# Patient Record
Sex: MALE | Race: WHITE | Employment: FULL TIME | URBAN - METROPOLITAN AREA
[De-identification: names, ages, dates, MRNs, and addresses within clinical notes are randomized per-mention and may not be internally consistent; named-entity substitution may affect disease eponyms.]

---

## 2020-08-11 ENCOUNTER — HOSPITAL ENCOUNTER (EMERGENCY)
Age: 20
Discharge: HOME | End: 2020-08-11
Attending: EMERGENCY MEDICINE
Payer: OTHER GOVERNMENT

## 2020-08-11 ENCOUNTER — APPOINTMENT (OUTPATIENT)
Dept: CT IMAGING | Age: 20
End: 2020-08-11
Attending: PHYSICIAN ASSISTANT
Payer: OTHER GOVERNMENT

## 2020-08-11 VITALS
HEART RATE: 108 BPM | BODY MASS INDEX: 25.76 KG/M2 | DIASTOLIC BLOOD PRESSURE: 75 MMHG | HEIGHT: 68 IN | OXYGEN SATURATION: 97 % | SYSTOLIC BLOOD PRESSURE: 135 MMHG | RESPIRATION RATE: 18 BRPM | TEMPERATURE: 98 F | WEIGHT: 170 LBS

## 2020-08-11 DIAGNOSIS — S01.01XA LACERATION OF SCALP, INITIAL ENCOUNTER: Primary | ICD-10-CM

## 2020-08-11 DIAGNOSIS — S09.90XA INJURY OF HEAD, INITIAL ENCOUNTER: ICD-10-CM

## 2020-08-11 PROCEDURE — 70450 CT HEAD/BRAIN W/O DYE: CPT

## 2020-08-11 PROCEDURE — 75810000293 HC SIMP/SUPERF WND  RPR

## 2020-08-11 PROCEDURE — 99282 EMERGENCY DEPT VISIT SF MDM: CPT

## 2020-08-11 NOTE — DISCHARGE INSTRUCTIONS
Patient Education        Cuts Closed With Staples: Care Instructions  Your Care Instructions  A cut can happen anywhere on your body. The doctor used staples to close the cut. Staples easily and quickly close a cut, which helps the cut heal.  Sometimes a cut can injure tendons, blood vessels, or nerves. If the cut went deep and through the skin, the doctor may have put in a layer of stitches below the staples. The deeper layer of stitches brings the deep part of the cut together. These stitches will dissolve and don't need to be removed. The staples in the upper layer are what you see on the cut. You may have a bandage. You will need to have the staples removed, usually in 7 to 14 days. The doctor has checked you carefully, but problems can develop later. If you notice any problems or new symptoms, get medical treatment right away. Follow-up care is a key part of your treatment and safety. Be sure to make and go to all appointments, and call your doctor if you are having problems. It's also a good idea to know your test results and keep a list of the medicines you take. How can you care for yourself at home? · Keep the cut dry for the first 24 to 48 hours. After this, you can shower if your doctor okays it. Pat the cut dry. · Don't soak the cut, such as in a bathtub. Your doctor will tell you when it's safe to get the cut wet. · If your doctor told you how to care for your cut, follow your doctor's instructions. If you did not get instructions, follow this general advice:  ? After the first 24 to 48 hours, wash around the cut with clean water 2 times a day. Don't use hydrogen peroxide or alcohol, which can slow healing. ? You may cover the cut with a thin layer of petroleum jelly, such as Vaseline, and a nonstick bandage. ? Apply more petroleum jelly and replace the bandage as needed. · Avoid any activity that could cause your cut to reopen. · Do not remove the staples on your own.  Your doctor will tell you when to come back to have the staples removed. · Take pain medicines exactly as directed. ? If the doctor gave you a prescription medicine for pain, take it as prescribed. ? If you are not taking a prescription pain medicine, ask your doctor if you can take an over-the-counter medicine. When should you call for help? Call your doctor now or seek immediate medical care if:  · You have new pain, or your pain gets worse. · The skin near the cut is cold or pale or changes color. · You have tingling, weakness, or numbness near the cut. · The cut starts to bleed, and blood soaks through the bandage. Oozing small amounts of blood is normal.  · You have trouble moving the area near the cut. · You have symptoms of infection, such as:  ? Increased pain, swelling, warmth, or redness around the cut.  ? Red streaks leading from the cut.  ? Pus draining from the cut.  ? A fever. Watch closely for changes in your health, and be sure to contact your doctor if:  · You do not get better as expected. Where can you learn more? Go to http://eleazar-channing.info/  Enter J700 in the search box to learn more about \"Cuts Closed With Staples: Care Instructions. \"  Current as of: June 26, 2019               Content Version: 12.5  © 9550-9184 [employer]. Care instructions adapted under license by [employer] (which disclaims liability or warranty for this information). If you have questions about a medical condition or this instruction, always ask your healthcare professional. [place] [address] any warranty or liability for your use of this information.

## 2020-08-11 NOTE — ED TRIAGE NOTES
Pt arrives through triage for evaluation of a laceration to his head. Pt tripped over his dog, hit right side of head on coffee table. Presents with laceration to head. Complains of feeling dizzy and lightheaded as well. Unsure if he lost consciousness with the fall. Is not on blood thinners.

## 2020-08-11 NOTE — ED PROVIDER NOTES
EMERGENCY DEPARTMENT HISTORY AND PHYSICAL EXAM    4:20 PM      Date: 8/11/2020  Patient Name: Carmen Xavier    History of Presenting Illness     Chief Complaint   Patient presents with    Fall    Laceration       History Provided By: Patient    Chief Complaint: tripped over dog, scalp laceration  Duration:  Minutes  Timing:  Acute  Location:   Quality: Aching  Severity: Moderate  Modifying Factors: none  Associated Symptoms: denies any other associated signs or symptoms      Additional History (Context):Phong Mcmahan is a 23 y.o. male who presents to the emergency department for evaluation of scalp laceration status post injury which occurred just prior to arrival.  Patient states he tripped over his dog and hit his head on the coffee table. He is unsure if he lost consciousness. He denies any other injuries from the fall. He initially felt a little dizzy and had a visual disturbance after the fall, but states that quickly resolved. He believes he is up-to-date on his tetanus shot. Bleeding is controlled. No recent fevers, chills, nausea, vomiting, diarrhea, chest pain, or any other complaints. Pt is not on blood thinners. PCP:  None      Past History     Past Medical History:  No past medical history on file. Past Surgical History:  No past surgical history on file. Family History:  No family history on file. Social History:  Social History     Tobacco Use    Smoking status: Not on file   Substance Use Topics    Alcohol use: Not on file    Drug use: Not on file       Allergies:  No Known Allergies      Review of Systems     Review of Systems   Constitutional: Negative for chills and fever. HENT: Negative for congestion, rhinorrhea and sore throat. Respiratory: Negative for cough and shortness of breath. Cardiovascular: Negative for chest pain. Gastrointestinal: Negative for abdominal pain, blood in stool, constipation, diarrhea, nausea and vomiting.    Genitourinary: Negative for dysuria, frequency and hematuria. Musculoskeletal: Negative for back pain and myalgias. Skin: Positive for wound. Negative for rash. Neurological: Negative for dizziness and headaches. All other systems reviewed and are negative. Physical Exam     Visit Vitals  /75 (BP 1 Location: Right arm, BP Patient Position: At rest)   Pulse (!) 108   Temp 98 °F (36.7 °C)   Resp 18   Ht 5' 8\" (1.727 m)   Wt 77.1 kg (170 lb)   SpO2 97%   BMI 25.85 kg/m²       Physical Exam  Vitals signs and nursing note reviewed. Constitutional:       General: He is not in acute distress. Appearance: He is well-developed. He is not diaphoretic. HENT:      Head: Normocephalic. Comments: 2.5cm laceration noted to frontal scalp. Bleeding is controlled. Eyes:      Conjunctiva/sclera: Conjunctivae normal.   Neck:      Musculoskeletal: Normal range of motion and neck supple. Cardiovascular:      Rate and Rhythm: Normal rate and regular rhythm. Heart sounds: Normal heart sounds. Pulmonary:      Effort: Pulmonary effort is normal. No respiratory distress. Breath sounds: Normal breath sounds. Chest:      Chest wall: No tenderness. Musculoskeletal: Normal range of motion. General: No deformity. Skin:     General: Skin is warm and dry. Neurological:      General: No focal deficit present. Mental Status: He is alert and oriented to person, place, and time. Cranial Nerves: No cranial nerve deficit. Comments: Pt is awake, alert, oriented x 3.  CNs 2-12 intact and normal.  No facial droop. Normal speech. Pt is answering questions and following commands appropriately. Pt ambulatory with even, steady gait, moving BUE and BLE with equal strength and intact distal neurovascular status. Diagnostic Study Results     Labs -  No results found for this or any previous visit (from the past 12 hour(s)).     Radiologic Studies -   Ct Head Wo Cont    Result Date: 8/11/2020  EXAM: CT head INDICATION: Trauma. Headache. COMPARISON: None. TECHNIQUE: Axial CT imaging of the head was performed without intravenous contrast. Standard multiplanar coronal and sagittal reformatted images were obtained and are included in interpretation. One or more dose reduction techniques were used on this CT: automated exposure control, adjustment of the mAs and/or kVp according to patient size, and iterative reconstruction techniques. The specific techniques used on this CT exam have been documented in the patient's electronic medical record. Digital Imaging and Communications in Medicine (DICOM) format image data are available to nonaffiliated external healthcare facilities or entities on a secure, media free, reciprocally searchable basis with patient authorization for at least a 12-month period after this study. _______________ FINDINGS: BRAIN AND POSTERIOR FOSSA: The sulci, folia, ventricles and basal cisterns are within normal limits for the patient?s age. There is no intracranial hemorrhage, mass effect, or midline shift. There are no areas of abnormal parenchymal attenuation. EXTRA-AXIAL SPACES AND MENINGES: There are no abnormal extra-axial fluid collections. CALVARIUM: Intact. SINUSES: Clear. OTHER: None. _______________     IMPRESSION: No acute intracranial abnormalities. Medical Decision Making   I am the first provider for this patient. I reviewed the vital signs, available nursing notes, past medical history, past surgical history, family history and social history. Vital Signs-Reviewed the patient's vital signs.     Pulse Oximetry Analysis -  97% on room air (Interpretation)    Records Reviewed: Nursing Notes and Old Medical Records (Time of Review: 4:20 PM)    ED Course: Progress Notes, Reevaluation, and Consults:    Provider Notes (Medical Decision Making):   Differential Diagnosis:  Laceration, avulsion, abrasion, puncture, skin tear, open fracture, contusion    Plan: Patient presents ambulatory, mildly tachycardic with otherwise normal vitals. Normal neurologic exam.  Unsure of LOC. CT head reveals no acute process. Wound was cleaned and closed with staples. At this time, patient is stable and appropriate for discharge home. Patient demonstrates understanding of current diagnoses and is in agreement with the treatment plan. They are advised that while the likelihood of serious underlying condition is low at this point given the evaluation performed today, we cannot fully rule it out. They are advised to immediately return with any new symptoms or worsening of current condition. All questions have been answered. Patient is given educational material regarding their diagnoses, including danger symptoms and when to return to the ED. Wound Repair    Date/Time: 8/11/2020 4:27 PM  Performed by: PAPreparation: skin prepped with ChloraPrep  Location details: scalp  Wound length:2.6 - 7.5 cm and 2.5 cm or less  Foreign bodies: no foreign bodies  Irrigation solution: saline  Irrigation method: syringe  Debridement: none  Skin closure: staples  Number of sutures: 8  Approximation: close  Patient tolerance: Patient tolerated the procedure well with no immediate complications  My total time at bedside, performing this procedure was 1-15 minutes. Diagnosis     Clinical Impression:   1. Laceration of scalp, initial encounter    2.  Injury of head, initial encounter        Disposition: DC Home    Follow-up Information     Follow up With Specialties Details Why Contact Kimberly Rivera  Call in 2 days  Joann So 60    [facility] EMERGENCY DEPT Emergency Medicine Go to As needed, If symptoms worsen [address]  [phone]           Patient's Medications    No medications on file     _______________________________    This note was dictated utilizing voice recognition software which may lead to typographical errors. I apologize in advance if the situation occurs. If questions arise please do not hesitate to contact me or call our department.   Karmen Shone, PA-C